# Patient Record
Sex: MALE | Race: OTHER | Employment: OTHER | URBAN - METROPOLITAN AREA
[De-identification: names, ages, dates, MRNs, and addresses within clinical notes are randomized per-mention and may not be internally consistent; named-entity substitution may affect disease eponyms.]

---

## 2018-02-10 ENCOUNTER — HOSPITAL ENCOUNTER (OUTPATIENT)
Age: 33
Discharge: HOME | End: 2018-02-10

## 2018-02-10 VITALS
OXYGEN SATURATION: 97 % | HEART RATE: 82 BPM | TEMPERATURE: 98 F | DIASTOLIC BLOOD PRESSURE: 87 MMHG | RESPIRATION RATE: 20 BRPM | SYSTOLIC BLOOD PRESSURE: 136 MMHG

## 2018-02-10 DIAGNOSIS — R53.83 FATIGUE, UNSPECIFIED TYPE: Primary | ICD-10-CM

## 2018-02-10 LAB
#LYMPHOCYTE IC: 2.4 X10ˆ3/UL (ref 0.9–3.2)
#MXD IC: 0.6 X10ˆ3/UL (ref 0.1–1)
#NEUTROPHIL IC: 4.8 X10ˆ3/UL (ref 1.3–6.7)
CREAT SERPL-MCNC: 0.9 MG/DL (ref 0.7–1.3)
GLUCOSE BLD-MCNC: 117 MG/DL (ref 70–99)
GLUCOSE BLD-MCNC: 99 MG/DL (ref 65–99)
HCT IC: 44.8 % (ref 37–54)
HCT IC: 45.1 % (ref 37–54)
HGB IC: 15 G/DL (ref 13–17)
HGB IC: 15.3 G/DL (ref 13–17)
ISTAT BLOOD GAS TCO2: 24 MMOL/L (ref 22–32)
ISTAT BUN: 13 MG/DL (ref 8–20)
ISTAT CHLORIDE: 105 MMOL/L (ref 101–111)
ISTAT HEMATOCRIT: 44 % (ref 37–53)
ISTAT IONIZED CALCIUM: 1.14 MMOL/L (ref 1.12–1.32)
ISTAT POTASSIUM: 3.6 MMOL/L (ref 3.6–5.1)
ISTAT SODIUM: 141 MMOL/L (ref 136–144)
LYMPHOCYTES NFR BLD AUTO: 31.2 %
MCH IC: 28.1 PG (ref 27–33.2)
MCH IC: 28.5 PG (ref 27–33.2)
MCHC IC: 33.5 G/DL (ref 31–37)
MCHC IC: 33.9 G/DL (ref 31–37)
MCV IC: 83.9 FL (ref 80–99)
MCV IC: 84.1 FL (ref 80–99)
MIXED CELL %: 7.1 %
NEUTROPHILS NFR BLD AUTO: 61.7 %
PLT IC: 219 X10ˆ3/UL (ref 150–450)
PLT IC: 226 X10ˆ3/UL (ref 150–450)
RBC IC: 5.34 X10ˆ6/UL (ref 4.3–5.7)
RBC IC: 5.36 X10ˆ6/UL (ref 4.3–5.7)
WBC IC: 7.8 X10ˆ3/UL (ref 4–13)

## 2018-02-10 PROCEDURE — 82962 GLUCOSE BLOOD TEST: CPT

## 2018-02-10 PROCEDURE — 99203 OFFICE O/P NEW LOW 30 MIN: CPT

## 2018-02-10 PROCEDURE — 85025 COMPLETE CBC W/AUTO DIFF WBC: CPT | Performed by: PHYSICIAN ASSISTANT

## 2018-02-10 PROCEDURE — 36415 COLL VENOUS BLD VENIPUNCTURE: CPT

## 2018-02-10 PROCEDURE — 80047 BASIC METABLC PNL IONIZED CA: CPT

## 2018-02-10 NOTE — ED PROVIDER NOTES
Patient Seen in: [facility]    History   No chief complaint on file.     Stated Complaint: fatigue / possible elevated blood sugar    HPI    CHIEF COMPLAINT: Fatigue off and on for 2-3 months    HISTORY OF PRESENT ILLNESS: Andrés pertinent to presenting problem.     Smoking status: Current Every Day Smoker                                                   Packs/day: 0.00      Years: 0.00      Smokeless tobacco: Never Used                          Review of Systems    Positive for st patient be discharged home to follow-up with outpatient primary care physician for further testing of chronic fatigue. Patient has no acute findings today. I discussed the laboratory results with the patient.  I discussed the diagnosis and need for follow

## 2018-02-10 NOTE — ED NOTES
CBC run, Sysmex revealed clot in apperature. After Sysmex flushed, another CBC was ordered and run without incident. Only one test completed for this pt.